# Patient Record
Sex: MALE | Race: WHITE | NOT HISPANIC OR LATINO | Employment: OTHER | URBAN - METROPOLITAN AREA
[De-identification: names, ages, dates, MRNs, and addresses within clinical notes are randomized per-mention and may not be internally consistent; named-entity substitution may affect disease eponyms.]

---

## 2023-10-12 DIAGNOSIS — R07.0 THROAT PAIN: Primary | ICD-10-CM

## 2023-10-12 NOTE — PROGRESS NOTES
Patient having persistent throat pain and would like to obtain CT neck. I will placed order and follow up with results. Recently had lab work done, denies any kidney disease.

## 2023-10-16 DIAGNOSIS — R07.0 THROAT PAIN: Primary | ICD-10-CM

## 2023-10-18 ENCOUNTER — LAB (OUTPATIENT)
Dept: LAB | Facility: LAB | Age: 53
End: 2023-10-18
Payer: COMMERCIAL

## 2023-10-18 DIAGNOSIS — R07.0 THROAT PAIN: ICD-10-CM

## 2023-10-18 LAB
ALBUMIN SERPL BCP-MCNC: 5.1 G/DL (ref 3.4–5)
ANION GAP SERPL CALC-SCNC: 11 MMOL/L (ref 10–20)
BUN SERPL-MCNC: 6 MG/DL (ref 6–23)
CALCIUM SERPL-MCNC: 9.8 MG/DL (ref 8.6–10.3)
CHLORIDE SERPL-SCNC: 97 MMOL/L (ref 98–107)
CO2 SERPL-SCNC: 30 MMOL/L (ref 21–32)
CREAT SERPL-MCNC: 0.78 MG/DL (ref 0.5–1.3)
GFR SERPL CREATININE-BSD FRML MDRD: >90 ML/MIN/1.73M*2
GLUCOSE SERPL-MCNC: 80 MG/DL (ref 74–99)
PHOSPHATE SERPL-MCNC: 4.1 MG/DL (ref 2.5–4.9)
POTASSIUM SERPL-SCNC: 4.4 MMOL/L (ref 3.5–5.3)
SODIUM SERPL-SCNC: 134 MMOL/L (ref 136–145)

## 2023-10-18 PROCEDURE — 36415 COLL VENOUS BLD VENIPUNCTURE: CPT

## 2023-10-18 PROCEDURE — 80069 RENAL FUNCTION PANEL: CPT

## 2023-10-22 ENCOUNTER — HOSPITAL ENCOUNTER (OUTPATIENT)
Dept: RADIOLOGY | Facility: HOSPITAL | Age: 53
Discharge: HOME | End: 2023-10-22
Payer: COMMERCIAL

## 2023-10-22 DIAGNOSIS — R07.0 THROAT PAIN: ICD-10-CM

## 2023-10-22 PROCEDURE — 70491 CT SOFT TISSUE NECK W/DYE: CPT | Performed by: RADIOLOGY

## 2023-10-22 PROCEDURE — 70491 CT SOFT TISSUE NECK W/DYE: CPT | Mod: MG

## 2023-10-22 PROCEDURE — 2550000001 HC RX 255 CONTRASTS: Performed by: NURSE PRACTITIONER

## 2023-10-22 RX ADMIN — IOHEXOL 100 ML: 350 INJECTION, SOLUTION INTRAVENOUS at 10:17

## 2023-10-26 NOTE — RESULT ENCOUNTER NOTE
Hey, yeah I don't see anything concerning. I didn't see anything when I scoped him the first time. If he is still having throat pain I would also consider scoping him again just to double check but I agree the scan looks good.  I am happy to see him back if he wants just have him schedule if he does. Almost wonder if this is some type of atypical pain related to his spine?

## 2023-12-08 PROBLEM — M25.561 ARTHRALGIA OF RIGHT KNEE: Status: ACTIVE | Noted: 2023-12-08

## 2023-12-08 PROBLEM — T50.905A DRUG REACTION: Status: ACTIVE | Noted: 2021-04-02

## 2023-12-08 PROBLEM — K64.8 INTERNAL HEMORRHOID: Status: ACTIVE | Noted: 2017-12-13

## 2023-12-08 PROBLEM — M24.20 EAGLE'S SYNDROME: Status: ACTIVE | Noted: 2022-12-22

## 2023-12-08 PROBLEM — S62.109A FRACTURE OF WRIST: Status: ACTIVE | Noted: 2021-03-26

## 2023-12-08 PROBLEM — R79.89 ELEVATED LFTS: Status: ACTIVE | Noted: 2023-12-08

## 2023-12-08 PROBLEM — K21.9 ACID REFLUX: Status: ACTIVE | Noted: 2023-12-08

## 2023-12-08 PROBLEM — R19.4 CHANGE IN BOWEL HABITS: Status: ACTIVE | Noted: 2023-12-08

## 2023-12-08 PROBLEM — R21 RASH: Status: ACTIVE | Noted: 2023-12-08

## 2023-12-08 PROBLEM — R10.84 GENERALIZED ABDOMINAL PAIN: Status: ACTIVE | Noted: 2023-12-08

## 2023-12-08 PROBLEM — R91.1 NODULE OF LEFT LUNG: Status: ACTIVE | Noted: 2023-12-08

## 2023-12-08 PROBLEM — R52 WHOLE BODY PAIN: Status: ACTIVE | Noted: 2021-04-02

## 2023-12-08 PROBLEM — B02.8 HERPES ZOSTER WITH COMPLICATION: Status: ACTIVE | Noted: 2019-01-10

## 2023-12-08 PROBLEM — F41.0 PANIC ATTACKS: Status: ACTIVE | Noted: 2023-12-08

## 2023-12-08 PROBLEM — M62.81 MUSCLE WEAKNESS (GENERALIZED): Status: ACTIVE | Noted: 2023-12-08

## 2023-12-08 PROBLEM — D49.0 IPMN (INTRADUCTAL PAPILLARY MUCINOUS NEOPLASM): Status: ACTIVE | Noted: 2020-08-28

## 2023-12-08 PROBLEM — G47.30 SLEEP APNEA: Status: ACTIVE | Noted: 2023-12-08

## 2023-12-08 PROBLEM — R93.1 AGATSTON CAC SCORE 200-399: Status: ACTIVE | Noted: 2023-03-23

## 2023-12-08 PROBLEM — M71.38 SYNOVIAL CYST OF LUMBAR FACET JOINT: Status: ACTIVE | Noted: 2023-12-08

## 2023-12-08 PROBLEM — I70.0 ATHEROSCLEROSIS OF AORTA (CMS-HCC): Status: ACTIVE | Noted: 2023-03-23

## 2023-12-08 PROBLEM — M54.9 BACK PAIN WITH RADIATION: Status: ACTIVE | Noted: 2023-12-08

## 2023-12-08 PROBLEM — R07.89 COSTOCHONDRAL PAIN: Status: ACTIVE | Noted: 2018-02-23

## 2023-12-08 PROBLEM — S43.432S LABRAL TEAR OF SHOULDER, LEFT, SEQUELA: Status: ACTIVE | Noted: 2023-12-08

## 2023-12-08 PROBLEM — R10.9 FLANK PAIN: Status: ACTIVE | Noted: 2022-12-22

## 2023-12-08 PROBLEM — L30.9 DERMATITIS, UNSPECIFIED: Status: ACTIVE | Noted: 2023-12-08

## 2023-12-08 PROBLEM — M96.1 LUMBAR POST-LAMINECTOMY SYNDROME: Status: ACTIVE | Noted: 2023-12-08

## 2023-12-08 PROBLEM — M51.27 HERNIATED NUCLEUS PULPOSUS, L5-S1: Status: ACTIVE | Noted: 2023-12-08

## 2023-12-08 PROBLEM — R19.7 ACUTE DIARRHEA: Status: ACTIVE | Noted: 2023-12-08

## 2023-12-08 PROBLEM — J03.90 LINGUAL TONSILLITIS: Status: ACTIVE | Noted: 2019-12-18

## 2023-12-08 PROBLEM — R29.898 LOWER EXTREMITY WEAKNESS: Status: ACTIVE | Noted: 2023-12-08

## 2023-12-08 PROBLEM — M25.579 ANKLE PAIN: Status: ACTIVE | Noted: 2023-12-08

## 2023-12-08 PROBLEM — G89.29 CHRONIC LOW BACK PAIN: Status: ACTIVE | Noted: 2023-12-08

## 2023-12-08 PROBLEM — G62.9 POLYNEUROPATHY: Status: ACTIVE | Noted: 2023-12-08

## 2023-12-08 PROBLEM — M25.512 PAIN IN JOINT OF LEFT SHOULDER: Status: ACTIVE | Noted: 2023-12-08

## 2023-12-08 PROBLEM — D17.1 LIPOMA OF ABDOMINAL WALL: Status: ACTIVE | Noted: 2021-04-02

## 2023-12-08 PROBLEM — K76.0 FATTY LIVER: Status: ACTIVE | Noted: 2023-12-08

## 2023-12-08 PROBLEM — M48.062 LUMBAR STENOSIS WITH NEUROGENIC CLAUDICATION: Status: ACTIVE | Noted: 2023-12-08

## 2023-12-08 PROBLEM — J95.830 HEMORRHAGE FOLLOWING TONSILLECTOMY: Status: ACTIVE | Noted: 2019-11-19

## 2023-12-08 PROBLEM — I10 HYPERTENSION: Status: ACTIVE | Noted: 2023-12-08

## 2023-12-08 PROBLEM — R09.82 POSTNASAL DRIP: Status: ACTIVE | Noted: 2020-03-06

## 2023-12-08 PROBLEM — H93.12 TINNITUS, LEFT EAR: Status: ACTIVE | Noted: 2021-02-12

## 2023-12-08 PROBLEM — R20.0 NUMBNESS AND TINGLING OF LEFT LEG: Status: ACTIVE | Noted: 2023-12-08

## 2023-12-08 PROBLEM — R13.10 DYSPHAGIA: Status: ACTIVE | Noted: 2020-05-08

## 2023-12-08 PROBLEM — J35.8 TONSIL STONE: Status: ACTIVE | Noted: 2023-12-08

## 2023-12-08 PROBLEM — R20.2 NUMBNESS AND TINGLING OF LEFT LEG: Status: ACTIVE | Noted: 2023-12-08

## 2023-12-08 PROBLEM — M54.30 SCIATIC NERVE PAIN: Status: ACTIVE | Noted: 2020-03-06

## 2023-12-08 PROBLEM — R89.9 ABNORMAL LABORATORY TEST: Status: ACTIVE | Noted: 2022-12-22

## 2023-12-08 PROBLEM — M48.00 SPINAL STENOSIS: Status: ACTIVE | Noted: 2022-12-22

## 2023-12-08 PROBLEM — F32.A DEPRESSION: Status: ACTIVE | Noted: 2023-12-08

## 2023-12-08 PROBLEM — G52.1 GLOSSOPHARYNGEAL NEURALGIA: Status: ACTIVE | Noted: 2023-12-08

## 2023-12-08 PROBLEM — E78.5 HLD (HYPERLIPIDEMIA): Status: ACTIVE | Noted: 2023-12-08

## 2023-12-08 PROBLEM — R93.89 ABNORMAL CXR: Status: ACTIVE | Noted: 2021-04-02

## 2023-12-08 PROBLEM — K11.5 SUBMANDIBULAR SIALOLITHIASIS: Status: ACTIVE | Noted: 2023-12-08

## 2023-12-08 PROBLEM — M79.2 NEUROGENIC PAIN: Status: ACTIVE | Noted: 2022-12-08

## 2023-12-08 PROBLEM — R09.A2 FEELING OF FOREIGN BODY IN THROAT: Status: ACTIVE | Noted: 2023-12-08

## 2023-12-08 PROBLEM — E66.9 OBESITY: Status: ACTIVE | Noted: 2023-12-08

## 2023-12-08 PROBLEM — H90.3 SENSORINEURAL HEARING LOSS, BILATERAL: Status: ACTIVE | Noted: 2021-02-12

## 2023-12-08 PROBLEM — M54.50 CHRONIC LOW BACK PAIN: Status: ACTIVE | Noted: 2023-12-08

## 2023-12-08 RX ORDER — EPINEPHRINE 0.3 MG/.3ML
INJECTION SUBCUTANEOUS
COMMUNITY
Start: 2023-10-24

## 2023-12-08 RX ORDER — DULOXETIN HYDROCHLORIDE 30 MG/1
CAPSULE, DELAYED RELEASE ORAL
COMMUNITY
Start: 2023-01-26

## 2023-12-08 RX ORDER — CYCLOBENZAPRINE HCL 10 MG
TABLET ORAL EVERY 8 HOURS PRN
COMMUNITY
Start: 2022-09-22

## 2023-12-08 RX ORDER — CHOLECALCIFEROL (VITAMIN D3) 25 MCG
TABLET ORAL
COMMUNITY

## 2023-12-08 RX ORDER — FLUTICASONE PROPIONATE 50 MCG
SPRAY, SUSPENSION (ML) NASAL
COMMUNITY
Start: 2023-10-24

## 2023-12-08 RX ORDER — AZELASTINE 1 MG/ML
SPRAY, METERED NASAL
COMMUNITY
Start: 2023-10-23

## 2023-12-08 RX ORDER — BUSPIRONE HYDROCHLORIDE 7.5 MG/1
TABLET ORAL
COMMUNITY
Start: 2019-04-12

## 2023-12-08 RX ORDER — BACLOFEN 20 MG/1
TABLET ORAL
COMMUNITY
Start: 2023-10-24

## 2023-12-08 RX ORDER — HYDROCORTISONE 25 MG/G
CREAM TOPICAL
COMMUNITY
Start: 2020-08-04

## 2023-12-08 RX ORDER — HYDROXYZINE PAMOATE 50 MG/1
CAPSULE ORAL
COMMUNITY
Start: 2023-01-13

## 2023-12-08 RX ORDER — ERYTHROMYCIN 5 MG/G
OINTMENT OPHTHALMIC
COMMUNITY
Start: 2023-01-30

## 2023-12-08 RX ORDER — GABAPENTIN 600 MG/1
600 TABLET ORAL 3 TIMES DAILY
COMMUNITY
Start: 2017-09-11

## 2023-12-08 RX ORDER — SUCRALFATE 1 G/1
TABLET ORAL
COMMUNITY
Start: 2020-02-21

## 2023-12-08 RX ORDER — KETOROLAC TROMETHAMINE 10 MG/1
TABLET, FILM COATED ORAL
COMMUNITY
Start: 2023-05-11

## 2023-12-08 RX ORDER — GABAPENTIN 300 MG/1
CAPSULE ORAL
COMMUNITY
Start: 2022-06-13

## 2023-12-08 RX ORDER — ALBUTEROL SULFATE 90 UG/1
2 AEROSOL, METERED RESPIRATORY (INHALATION) EVERY 4 HOURS PRN
COMMUNITY
Start: 2017-07-03

## 2023-12-08 RX ORDER — DOCUSATE SODIUM 100 MG/1
CAPSULE, LIQUID FILLED ORAL
COMMUNITY
Start: 2022-09-22

## 2023-12-08 RX ORDER — CARBAMAZEPINE 200 MG/1
TABLET, EXTENDED RELEASE ORAL
COMMUNITY
Start: 2023-10-09

## 2023-12-08 RX ORDER — ASPIRIN 81 MG/1
TABLET ORAL
COMMUNITY

## 2023-12-08 RX ORDER — ACETIC ACID 20.65 MG/ML
5 SOLUTION AURICULAR (OTIC) 3 TIMES DAILY
COMMUNITY
Start: 2021-02-11

## 2023-12-08 RX ORDER — PERPHENAZINE 8 MG
TABLET ORAL
COMMUNITY

## 2023-12-08 RX ORDER — ATORVASTATIN CALCIUM 40 MG/1
TABLET, FILM COATED ORAL
COMMUNITY
Start: 2023-10-24

## 2023-12-08 RX ORDER — CARBAMAZEPINE 200 MG/1
TABLET ORAL
COMMUNITY

## 2023-12-08 RX ORDER — TAMSULOSIN HYDROCHLORIDE 0.4 MG/1
CAPSULE ORAL
COMMUNITY
Start: 2023-10-09

## 2023-12-08 RX ORDER — DULOXETIN HYDROCHLORIDE 60 MG/1
CAPSULE, DELAYED RELEASE ORAL
COMMUNITY
Start: 2023-10-16

## 2023-12-08 RX ORDER — OLMESARTAN MEDOXOMIL 20 MG/1
TABLET ORAL
COMMUNITY
Start: 2023-10-30

## 2023-12-08 RX ORDER — NEOMYCIN SULFATE, POLYMYXIN B SULFATE, AND DEXAMETHASONE 3.5; 10000; 1 MG/G; [USP'U]/G; MG/G
OINTMENT OPHTHALMIC
COMMUNITY
Start: 2023-02-09

## 2023-12-08 RX ORDER — BUSPIRONE HYDROCHLORIDE 15 MG/1
TABLET ORAL
COMMUNITY
Start: 2023-10-24

## 2023-12-11 ENCOUNTER — OFFICE VISIT (OUTPATIENT)
Dept: OTOLARYNGOLOGY | Facility: CLINIC | Age: 53
End: 2023-12-11
Payer: MEDICARE

## 2023-12-11 VITALS — WEIGHT: 207 LBS | BODY MASS INDEX: 30.66 KG/M2 | HEIGHT: 69 IN

## 2023-12-11 DIAGNOSIS — H93.8X3 SENSATION OF PLUGGED EAR ON BOTH SIDES: Primary | ICD-10-CM

## 2023-12-11 DIAGNOSIS — H61.23 BILATERAL IMPACTED CERUMEN: ICD-10-CM

## 2023-12-11 PROBLEM — I65.23 ATHEROSCLEROSIS OF BOTH CAROTID ARTERIES: Status: ACTIVE | Noted: 2023-10-26

## 2023-12-11 PROCEDURE — 99212 OFFICE O/P EST SF 10 MIN: CPT | Performed by: NURSE PRACTITIONER

## 2023-12-11 PROCEDURE — 1036F TOBACCO NON-USER: CPT | Performed by: NURSE PRACTITIONER

## 2023-12-11 PROCEDURE — 69210 REMOVE IMPACTED EAR WAX UNI: CPT | Performed by: NURSE PRACTITIONER

## 2023-12-11 ASSESSMENT — PATIENT HEALTH QUESTIONNAIRE - PHQ9
SUM OF ALL RESPONSES TO PHQ9 QUESTIONS 1 AND 2: 0
1. LITTLE INTEREST OR PLEASURE IN DOING THINGS: NOT AT ALL
2. FEELING DOWN, DEPRESSED OR HOPELESS: NOT AT ALL

## 2023-12-11 ASSESSMENT — COLUMBIA-SUICIDE SEVERITY RATING SCALE - C-SSRS
6. HAVE YOU EVER DONE ANYTHING, STARTED TO DO ANYTHING, OR PREPARED TO DO ANYTHING TO END YOUR LIFE?: NO
1. IN THE PAST MONTH, HAVE YOU WISHED YOU WERE DEAD OR WISHED YOU COULD GO TO SLEEP AND NOT WAKE UP?: NO
2. HAVE YOU ACTUALLY HAD ANY THOUGHTS OF KILLING YOURSELF?: NO

## 2023-12-11 ASSESSMENT — ENCOUNTER SYMPTOMS
LOSS OF SENSATION IN FEET: 1
OCCASIONAL FEELINGS OF UNSTEADINESS: 0
DEPRESSION: 0

## 2023-12-11 ASSESSMENT — PAIN SCALES - GENERAL: PAINLEVEL: 0-NO PAIN

## 2023-12-11 NOTE — PROGRESS NOTES
Subjective   Patient ID: Willard Arriaga is a 53 y.o. male who presents for Cerumen Impaction.    HPI  Accompanied by 9 year-old daughter.  Here for ear cleaning. No complaints with the ears.   Still having issues with the throat. Is going to schedule appt with Dr. Feliz to further discuss CT.     Patient Active Problem List   Diagnosis    Whole body pain    Otalgia    Neurogenic pain    Cervicalgia    Tinnitus, left ear    Synovial cyst of lumbar facet joint    Spinal stenosis    Lumbosacral radiculopathy at S1    Lumbar stenosis with neurogenic claudication    Lumbar post-laminectomy syndrome    Labral tear of shoulder, left, sequela    Cervical radiculopathy    Solitary pulmonary nodule    Nodule of left lung    Sleep apnea    Obstructive sleep apnea (adult) (pediatric)    Submandibular sialolithiasis    Sensorineural hearing loss, bilateral    S/P lumbar spine operation    Rash    Postnasal drip    Polyneuropathy    Panic attacks    Pain in joint of left shoulder    Displacement of lumbar intervertebral disc without myelopathy    Obesity    Numbness and tingling of left leg    Nerve root and plexus disorder    Muscle weakness (generalized)    Lower extremity weakness    Lumbosacral spondylosis without myelopathy    Lipoma of abdominal wall    Lingual tonsillitis    IPMN (intraductal papillary mucinous neoplasm)    Internal hemorrhoid    Hypertension    HLD (hyperlipidemia)    Herpes zoster with complication    Herniated nucleus pulposus, L5-S1    Hemorrhage following tonsillectomy    Glossopharyngeal neuralgia    Eagle's syndrome    Generalized abdominal pain    Acid reflux    Fracture of wrist    Flank pain    Feeling of foreign body in throat    Fatty liver    Elevated LFTs    Elevated fasting glucose    Dysphagia    Drug reaction    Dermatitis, unspecified    Depression    Degenerative disc disease, cervical    Costochondral pain    Closed fracture of carpal bone    Chronic low back pain    Change in bowel  habits    Atherosclerosis of aorta (CMS/HCC)    Arthralgia of right knee    Anxiety    Ankle pain    Tonsil stone    Agatston CAC score 200-399    Acute laryngitis    Acute diarrhea    Abnormal laboratory test    Abnormal CXR    Abnormal blood chemistry    Lung disease    Back pain with radiation    Sciatic nerve pain    Atherosclerosis of both carotid arteries     Past Surgical History:   Procedure Laterality Date    ANKLE SURGERY  07/31/2014    Ankle Surgery    CT ANGIO CORONARY ART WITH HEARTFLOW IF SCORE >30%  1/6/2023    CT ANGIO CORONARY ART WITH HEARTFLOW IF SCORE >30% 1/6/2023    OTHER SURGICAL HISTORY  07/31/2014    Fusion / Refusion Of 2-3 Vertebrae    OTHER SURGICAL HISTORY  07/31/2014    Ant Spinal Diskectomy, Osteophytectomy Lumbar Interspace    OTHER SURGICAL HISTORY  07/25/2019    Sialoadenectomy     Review of Systems    All other systems have been reviewed and are negative for complaints except for those mentioned in history of present illness, past medical history and problem list.    Objective   Physical Exam    Right Ear: External inspection of ear with no deformity, scars, or masses. EAC is impacted with cerumen, TM not visible.     Left Ear: External inspection of ear with no deformity, scars, or masses. EAC is impacted with cerumen, TM not visible.     Ear cerumen removal    Date/Time: 12/11/2023 4:44 PM    Performed by: NU Baires  Authorized by: NU Baires    Procedure details:     Location:  L ear and R ear    Procedure outcomes: cerumen removed    Post-procedure details:     Inspection:  No bleeding, ear canal clear and TM intact    Hearing quality:  Improved    Procedure completion:  Tolerated  Comments:      Bilateral canals with cerumen impaction.  Using the microscope, suction, and alligator forceps, large amounts of soft brown cerumen removed bilaterally. Both TMs intact. No effusions or retractions noted.  Patient tolerated procedure well.       Assessment/Plan   Diagnoses and all orders for this visit:  Sensation of plugged ear on both sides  Bilateral impacted cerumen  Follow up in 6 months for repeat ear cleaning.  All questions answered to patient satisfaction.          VIRAL Baires-CNP 12/11/23 3:56 PM

## 2024-03-11 ENCOUNTER — OFFICE VISIT (OUTPATIENT)
Dept: OTOLARYNGOLOGY | Facility: CLINIC | Age: 54
End: 2024-03-11
Payer: COMMERCIAL

## 2024-03-11 VITALS
WEIGHT: 211 LBS | BODY MASS INDEX: 31.25 KG/M2 | HEIGHT: 69 IN | SYSTOLIC BLOOD PRESSURE: 147 MMHG | TEMPERATURE: 98.2 F | DIASTOLIC BLOOD PRESSURE: 87 MMHG | HEART RATE: 60 BPM

## 2024-03-11 DIAGNOSIS — H61.23 EXCESSIVE CERUMEN IN BOTH EAR CANALS: ICD-10-CM

## 2024-03-11 DIAGNOSIS — H92.01 RIGHT EAR PAIN: Primary | ICD-10-CM

## 2024-03-11 PROCEDURE — 3077F SYST BP >= 140 MM HG: CPT | Performed by: NURSE PRACTITIONER

## 2024-03-11 PROCEDURE — 99213 OFFICE O/P EST LOW 20 MIN: CPT | Performed by: NURSE PRACTITIONER

## 2024-03-11 PROCEDURE — 3079F DIAST BP 80-89 MM HG: CPT | Performed by: NURSE PRACTITIONER

## 2024-03-11 PROCEDURE — 1036F TOBACCO NON-USER: CPT | Performed by: NURSE PRACTITIONER

## 2024-03-11 SDOH — ECONOMIC STABILITY: FOOD INSECURITY: WITHIN THE PAST 12 MONTHS, THE FOOD YOU BOUGHT JUST DIDN'T LAST AND YOU DIDN'T HAVE MONEY TO GET MORE.: NEVER TRUE

## 2024-03-11 SDOH — ECONOMIC STABILITY: FOOD INSECURITY: WITHIN THE PAST 12 MONTHS, YOU WORRIED THAT YOUR FOOD WOULD RUN OUT BEFORE YOU GOT MONEY TO BUY MORE.: NEVER TRUE

## 2024-03-11 ASSESSMENT — LIFESTYLE VARIABLES
HOW OFTEN DO YOU HAVE SIX OR MORE DRINKS ON ONE OCCASION: NEVER
HOW MANY STANDARD DRINKS CONTAINING ALCOHOL DO YOU HAVE ON A TYPICAL DAY: 1 OR 2
HOW OFTEN DO YOU HAVE A DRINK CONTAINING ALCOHOL: 2-3 TIMES A WEEK
SKIP TO QUESTIONS 9-10: 1
AUDIT-C TOTAL SCORE: 3

## 2024-03-11 ASSESSMENT — ENCOUNTER SYMPTOMS
OCCASIONAL FEELINGS OF UNSTEADINESS: 0
LOSS OF SENSATION IN FEET: 0
DEPRESSION: 0

## 2024-03-11 ASSESSMENT — PAIN SCALES - GENERAL: PAINLEVEL: 2

## 2024-03-11 NOTE — PROGRESS NOTES
Subjective   Patient ID: Willard Arriaga is a 53 y.o. male who presents for Cerumen Impaction.    HPI  Here for ear cleaning. He is having right ear pain, since 1/31. Initially it was constant. Now it comes and goes.   Last cleaning was 12/11/2023.  Still having some throat issues, likely stemming from his neck.       Patient Active Problem List   Diagnosis    Whole body pain    Otalgia    Neurogenic pain    Cervicalgia    Tinnitus, left ear    Synovial cyst of lumbar facet joint    Spinal stenosis    Lumbosacral radiculopathy at S1    Lumbar stenosis with neurogenic claudication    Lumbar post-laminectomy syndrome    Labral tear of shoulder, left, sequela    Cervical radiculopathy    Solitary pulmonary nodule    Nodule of left lung    Sleep apnea    Obstructive sleep apnea (adult) (pediatric)    Submandibular sialolithiasis    Sensorineural hearing loss, bilateral    S/P lumbar spine operation    Rash    Postnasal drip    Polyneuropathy    Panic attacks    Pain in joint of left shoulder    Displacement of lumbar intervertebral disc without myelopathy    Obesity    Numbness and tingling of left leg    Nerve root and plexus disorder    Muscle weakness (generalized)    Lower extremity weakness    Lumbosacral spondylosis without myelopathy    Lipoma of abdominal wall    Lingual tonsillitis    IPMN (intraductal papillary mucinous neoplasm)    Internal hemorrhoid    Hypertension    HLD (hyperlipidemia)    Herpes zoster with complication    Herniated nucleus pulposus, L5-S1    Hemorrhage following tonsillectomy    Glossopharyngeal neuralgia    Eagle's syndrome    Generalized abdominal pain    Acid reflux    Fracture of wrist    Flank pain    Feeling of foreign body in throat    Fatty liver    Elevated LFTs    Elevated fasting glucose    Dysphagia    Drug reaction    Dermatitis, unspecified    Depression    Degenerative disc disease, cervical    Costochondral pain    Closed fracture of carpal bone    Chronic low back pain     Change in bowel habits    Atherosclerosis of aorta (CMS/HCC)    Arthralgia of right knee    Anxiety    Ankle pain    Tonsil stone    Agatston CAC score 200-399    Acute laryngitis    Acute diarrhea    Abnormal laboratory test    Abnormal CXR    Abnormal blood chemistry    Lung disease    Back pain with radiation    Sciatic nerve pain    Atherosclerosis of both carotid arteries     Past Surgical History:   Procedure Laterality Date    ANKLE SURGERY  07/31/2014    Ankle Surgery    CT ANGIO CORONARY ART WITH HEARTFLOW IF SCORE >30%  1/6/2023    CT ANGIO CORONARY ART WITH HEARTFLOW IF SCORE >30% 1/6/2023    OTHER SURGICAL HISTORY  07/31/2014    Fusion / Refusion Of 2-3 Vertebrae    OTHER SURGICAL HISTORY  07/31/2014    Ant Spinal Diskectomy, Osteophytectomy Lumbar Interspace    OTHER SURGICAL HISTORY  07/25/2019    Sialoadenectomy     Review of Systems    All other systems have been reviewed and are negative for complaints except for those mentioned in history of present illness, past medical history and problem list.    Objective   Physical Exam    Constitutional: No fever, chills, weight loss or weight gain  General appearance: Appears well, well-nourished, well groomed. No acute distress.    Communication: Normal communication    Psychiatric: Oriented to person, place and time. Normal mood and affect.    Neurologic: Cranial nerves II-XII grossly intact and symmetric bilaterally.    Head and Face:  Head: Atraumatic with no masses, lesions or scarring.  Face: Normal symmetry. No scars or deformities.  TMJ: Normal, no trismus.    Eyes: Conjunctiva not edematous or erythematous.     Right Ear: External inspection of ear with no deformity, scars, or masses. Ear canal is with non-occluding cerumen that was removed using the microscope, suction, and alligator forceps. After removal, TM is intact with no sign of infection, effusion, or retraction.      Left Ear: External inspection of ear with no deformity, scars, or  masses. Ear canal is with non-occluding cerumen that was removed using the microscope, suction, and alligator forceps. After removal, TM is intact with no sign of infection, effusion, or retraction.      Nose: External inspection of nose: No nasal lesions, lacerations or scars. Anterior rhinoscopy with limited visualization past the inferior turbinates. No tenderness on frontal or maxillary sinus palpation.    Oral Cavity/Mouth: Oral cavity and oropharynx mucosa moist and pink. No lesions or masses. Dentition normal. Tonsils appear surgically removed. Uvula is midline. Tongue with no masses or lesions. Tongue with good mobility. The oropharynx is clear.    Neck: Normal appearing, symmetric, trachea midline.     Cardiovascular: Examination of peripheral vascular system shows no clubbing or cyanosis.    Respiratory: No respiratory distress increased work of breathing. Inspection of the chest with symmetric chest expansion and normal respiratory effort.    Skin: No head and neck rashes.    Lymph nodes: No adenopathy.     Assessment/Plan   Diagnoses and all orders for this visit:  Right otalgia  Excessive cerumen bilaterally.  Ears were successfully cleaned. Reassurance given. Ear pain may be due to his neck issues. Discussed CT IAC, CT neck showed clear mastoids. Will monitor for now.  Follow up in 6 months for repeat ear cleaning.  All questions answered to patient satisfaction.          VIRAL Baires-CNP 03/11/24 3:28 PM

## 2024-06-03 ENCOUNTER — APPOINTMENT (OUTPATIENT)
Dept: OTOLARYNGOLOGY | Facility: CLINIC | Age: 54
End: 2024-06-03
Payer: COMMERCIAL

## 2024-06-05 ENCOUNTER — APPOINTMENT (OUTPATIENT)
Dept: OTOLARYNGOLOGY | Facility: CLINIC | Age: 54
End: 2024-06-05
Payer: COMMERCIAL

## 2024-07-17 ENCOUNTER — OFFICE VISIT (OUTPATIENT)
Dept: OTOLARYNGOLOGY | Facility: CLINIC | Age: 54
End: 2024-07-17
Payer: COMMERCIAL

## 2024-07-17 VITALS
WEIGHT: 193 LBS | DIASTOLIC BLOOD PRESSURE: 66 MMHG | SYSTOLIC BLOOD PRESSURE: 115 MMHG | TEMPERATURE: 97.6 F | BODY MASS INDEX: 28.58 KG/M2 | HEIGHT: 69 IN | HEART RATE: 66 BPM

## 2024-07-17 DIAGNOSIS — H61.23 EXCESSIVE CERUMEN IN BOTH EAR CANALS: Primary | ICD-10-CM

## 2024-07-17 DIAGNOSIS — J02.9 SORE THROAT: ICD-10-CM

## 2024-07-17 PROCEDURE — 99213 OFFICE O/P EST LOW 20 MIN: CPT | Performed by: NURSE PRACTITIONER

## 2024-07-17 PROCEDURE — 3008F BODY MASS INDEX DOCD: CPT | Performed by: NURSE PRACTITIONER

## 2024-07-17 PROCEDURE — 3078F DIAST BP <80 MM HG: CPT | Performed by: NURSE PRACTITIONER

## 2024-07-17 PROCEDURE — 3074F SYST BP LT 130 MM HG: CPT | Performed by: NURSE PRACTITIONER

## 2024-07-17 RX ORDER — ACETAMINOPHEN 325 MG/1
1 TABLET ORAL EVERY 6 HOURS PRN
COMMUNITY
Start: 2024-07-12

## 2024-07-17 ASSESSMENT — ENCOUNTER SYMPTOMS
OCCASIONAL FEELINGS OF UNSTEADINESS: 0
LOSS OF SENSATION IN FEET: 0
DEPRESSION: 0

## 2024-07-17 ASSESSMENT — PAIN SCALES - GENERAL: PAINLEVEL: 0-NO PAIN

## 2024-07-17 NOTE — PROGRESS NOTES
Subjective   Patient ID: Willard Arriaga is a 53 y.o. male who presents for No chief complaint on file..    HPI  Patient here for ear cleaning. Accompanied by his two younger children. Last visit was 3/11/2024.  Ears are fine.   On Friday he woke up with a sore throat, had a red spot behind his uvula. Went to urgent care, strep negative.  Was diagnosed with viral pharyngitis. The last two days it has started feeling better, and it looks better.     Patient Active Problem List   Diagnosis    Whole body pain    Otalgia    Neurogenic pain    Cervicalgia    Tinnitus, left ear    Synovial cyst of lumbar facet joint    Spinal stenosis    Lumbosacral radiculopathy at S1    Lumbar stenosis with neurogenic claudication    Lumbar post-laminectomy syndrome    Labral tear of shoulder, left, sequela    Cervical radiculopathy    Solitary pulmonary nodule    Nodule of left lung    Sleep apnea    Obstructive sleep apnea (adult) (pediatric)    Submandibular sialolithiasis    Sensorineural hearing loss, bilateral    S/P lumbar spine operation    Rash    Postnasal drip    Polyneuropathy    Panic attacks    Pain in joint of left shoulder    Displacement of lumbar intervertebral disc without myelopathy    Obesity    Numbness and tingling of left leg    Nerve root and plexus disorder    Muscle weakness (generalized)    Lower extremity weakness    Lumbosacral spondylosis without myelopathy    Lipoma of abdominal wall    Lingual tonsillitis    IPMN (intraductal papillary mucinous neoplasm)    Internal hemorrhoid    Hypertension    HLD (hyperlipidemia)    Herpes zoster with complication    Herniated nucleus pulposus, L5-S1    Hemorrhage following tonsillectomy    Glossopharyngeal neuralgia    Eagle's syndrome    Generalized abdominal pain    Acid reflux    Fracture of wrist    Flank pain    Feeling of foreign body in throat    Fatty liver    Elevated LFTs    Elevated fasting glucose    Dysphagia    Drug reaction    Dermatitis, unspecified     Depression    Degenerative disc disease, cervical    Costochondral pain    Closed fracture of carpal bone    Chronic low back pain    Change in bowel habits    Atherosclerosis of aorta (CMS-HCC)    Arthralgia of right knee    Anxiety    Ankle pain    Tonsil stone    [place] CAC score 200-399    Acute laryngitis    Acute diarrhea    Abnormal laboratory test    Abnormal CXR    Abnormal blood chemistry    Lung disease    Back pain with radiation    Sciatic nerve pain    Atherosclerosis of both carotid arteries     Past Surgical History:   Procedure Laterality Date    ANKLE SURGERY  07/31/2014    Ankle Surgery    CT ANGIO CORONARY ART WITH HEARTFLOW IF SCORE >30%  1/6/2023    CT ANGIO CORONARY ART WITH HEARTFLOW IF SCORE >30% 1/6/2023    OTHER SURGICAL HISTORY  07/31/2014    Fusion / Refusion Of 2-3 Vertebrae    OTHER SURGICAL HISTORY  07/31/2014    Ant Spinal Diskectomy, Osteophytectomy Lumbar Interspace    OTHER SURGICAL HISTORY  07/25/2019    Sialoadenectomy     Review of Systems    All other systems have been reviewed and are negative for complaints except for those mentioned in history of present illness, past medical history and problem list.    Objective   Physical Exam    Constitutional: No fever, chills, weight loss or weight gain  General appearance: Appears well, well-nourished, well groomed. No acute distress.    Communication: Normal communication    Psychiatric: Oriented to person, place and time. Normal mood and affect.    Neurologic: Cranial nerves II-XII grossly intact and symmetric bilaterally.    Head and Face:  Head: Atraumatic with no masses, lesions or scarring.  Face: Normal symmetry. No scars or deformities.  TMJ: Normal, no trismus.    Eyes: Conjunctiva not edematous or erythematous.     Right Ear: External inspection of ear with no deformity, scars, or masses. Ear canal is with non-occluding cerumen that was removed using the otoscope and suction. After removal, TM is intact with no sign of  infection, effusion, or retraction.      Left Ear: External inspection of ear with no deformity, scars, or masses. Ear canal is with non-occluding cerumen that was removed using the otoscope and suction. After removal, TM is intact with no sign of infection, effusion, or retraction.      Nose: External inspection of nose: No nasal lesions, lacerations or scars. Anterior rhinoscopy with limited visualization past the inferior turbinates. No tenderness on frontal or maxillary sinus palpation.    Oral Cavity/Mouth: Oral cavity and oropharynx mucosa moist and pink. No lesions or masses. Dentition normal. Tonsils appear surgically removed. There is a 1 mm area of redness/irritation at the posterior pharyngeal wall. No evidence of infection, or concerning mass/lesion. Uvula is midline. Tongue with no masses or lesions. Tongue with good mobility. The oropharynx is clear.    Neck: Normal appearing, symmetric, trachea midline.     Cardiovascular: Examination of peripheral vascular system shows no clubbing or cyanosis.    Respiratory: No respiratory distress increased work of breathing. Inspection of the chest with symmetric chest expansion and normal respiratory effort.    Skin: No head and neck rashes.    Lymph nodes: No adenopathy.     Assessment/Plan   Diagnoses and all orders for this visit:  Excessive cerumen bilaterally.  Ears were successfully cleaned. Reassurance given. Follow up in 3-4 months for repeat ear cleaning, or as needed.  Sore throat  Reassurance given. Symptoms seem to be improving. Exam shows nothing worrisome. Monitor at this time and follow up with any new or worsening symptoms.    All questions answered to patient satisfaction.      VIRAL Baires-CNP 07/17/24 2:53 PM    no

## 2024-08-29 ENCOUNTER — TELEPHONE (OUTPATIENT)
Dept: AUDIOLOGY | Facility: CLINIC | Age: 54
End: 2024-08-29
Payer: COMMERCIAL

## 2024-09-04 ENCOUNTER — OFFICE VISIT (OUTPATIENT)
Dept: OTOLARYNGOLOGY | Facility: CLINIC | Age: 54
End: 2024-09-04
Payer: COMMERCIAL

## 2024-09-04 VITALS
RESPIRATION RATE: 16 BRPM | HEART RATE: 65 BPM | DIASTOLIC BLOOD PRESSURE: 83 MMHG | SYSTOLIC BLOOD PRESSURE: 142 MMHG | WEIGHT: 200.25 LBS | HEIGHT: 69 IN | OXYGEN SATURATION: 99 % | BODY MASS INDEX: 29.66 KG/M2 | TEMPERATURE: 98 F

## 2024-09-04 DIAGNOSIS — M26.629 TMJ PAIN DYSFUNCTION SYNDROME: ICD-10-CM

## 2024-09-04 DIAGNOSIS — H92.01 RIGHT EAR PAIN: Primary | ICD-10-CM

## 2024-09-04 PROCEDURE — 3077F SYST BP >= 140 MM HG: CPT | Performed by: NURSE PRACTITIONER

## 2024-09-04 PROCEDURE — 99213 OFFICE O/P EST LOW 20 MIN: CPT | Performed by: NURSE PRACTITIONER

## 2024-09-04 PROCEDURE — 3079F DIAST BP 80-89 MM HG: CPT | Performed by: NURSE PRACTITIONER

## 2024-09-04 PROCEDURE — 1036F TOBACCO NON-USER: CPT | Performed by: NURSE PRACTITIONER

## 2024-09-04 PROCEDURE — 3008F BODY MASS INDEX DOCD: CPT | Performed by: NURSE PRACTITIONER

## 2024-09-04 ASSESSMENT — ENCOUNTER SYMPTOMS
OCCASIONAL FEELINGS OF UNSTEADINESS: 0
LOSS OF SENSATION IN FEET: 1
DEPRESSION: 0

## 2024-09-04 ASSESSMENT — PATIENT HEALTH QUESTIONNAIRE - PHQ9
2. FEELING DOWN, DEPRESSED OR HOPELESS: NOT AT ALL
1. LITTLE INTEREST OR PLEASURE IN DOING THINGS: NOT AT ALL
SUM OF ALL RESPONSES TO PHQ9 QUESTIONS 1 AND 2: 0

## 2024-09-04 ASSESSMENT — PAIN SCALES - GENERAL: PAINLEVEL: 2

## 2024-09-04 NOTE — PROGRESS NOTES
DOCUMENTATION ONLY:  Prior authorization for Humira approved from 11/16/18 to 12/31/19    Case Id: [id number]    Co-pay: $3.70    Patient Assistance is not required.     Subjective   Patient ID: Willard Arriaga is a 53 y.o. male who presents for Follow-up.    HPI  Patient following up for a possible ear infection.  He is accompanied by his 2 younger children.  He started his Levofloxacin on Saturday. Been taking his probiotics as well. Seems to be helping. Saw PCP.   Was having jaw pain as well, thinks TMJ. Has a mouthguard that does help.  Still having right ear pain. He denies any hearing loss.    Patient Active Problem List   Diagnosis    Whole body pain    Otalgia    Neurogenic pain    Cervicalgia    Tinnitus, left ear    Synovial cyst of lumbar facet joint    Spinal stenosis    Lumbosacral radiculopathy at S1    Lumbar stenosis with neurogenic claudication    Lumbar post-laminectomy syndrome    Labral tear of shoulder, left, sequela    Cervical radiculopathy    Solitary pulmonary nodule    Nodule of left lung    Sleep apnea    Obstructive sleep apnea (adult) (pediatric)    Submandibular sialolithiasis    Sensorineural hearing loss, bilateral    S/P lumbar spine operation    Rash    Postnasal drip    Polyneuropathy    Panic attacks    Pain in joint of left shoulder    Displacement of lumbar intervertebral disc without myelopathy    Obesity    Numbness and tingling of left leg    Nerve root and plexus disorder    Muscle weakness (generalized)    Lower extremity weakness    Lumbosacral spondylosis without myelopathy    Lipoma of abdominal wall    Lingual tonsillitis    IPMN (intraductal papillary mucinous neoplasm)    Internal hemorrhoid    Hypertension    HLD (hyperlipidemia)    Herpes zoster with complication    Herniated nucleus pulposus, L5-S1    Hemorrhage following tonsillectomy    Glossopharyngeal neuralgia    Eagle's syndrome    Generalized abdominal pain    Acid reflux    Fracture of wrist    Flank pain    Feeling of foreign body in throat    Fatty liver    Elevated LFTs    Elevated fasting glucose    Dysphagia    Drug reaction    Dermatitis, unspecified    Depression     Degenerative disc disease, cervical    Costochondral pain    Closed fracture of carpal bone    Chronic low back pain    Change in bowel habits    Atherosclerosis of aorta (CMS-HCC)    Arthralgia of right knee    Anxiety    Ankle pain    Tonsil stone    [place] CAC score 200-399    Acute laryngitis    Acute diarrhea    Abnormal laboratory test    Abnormal CXR    Abnormal blood chemistry    Lung disease    Back pain with radiation    Sciatic nerve pain    Atherosclerosis of both carotid arteries     Past Surgical History:   Procedure Laterality Date    ANKLE SURGERY  07/31/2014    Ankle Surgery    CT ANGIO CORONARY ART WITH HEARTFLOW IF SCORE >30%  1/6/2023    CT ANGIO CORONARY ART WITH HEARTFLOW IF SCORE >30% 1/6/2023    OTHER SURGICAL HISTORY  07/31/2014    Fusion / Refusion Of 2-3 Vertebrae    OTHER SURGICAL HISTORY  07/31/2014    Ant Spinal Diskectomy, Osteophytectomy Lumbar Interspace    OTHER SURGICAL HISTORY  07/25/2019    Sialoadenectomy     Review of Systems    All other systems have been reviewed and are negative for complaints except for those mentioned in history of present illness, past medical history and problem list.    Objective   Physical Exam    Constitutional: No fever, chills, weight loss or weight gain  General appearance: Appears well, well-nourished, well groomed. No acute distress.    Communication: Normal communication    Psychiatric: Oriented to person, place and time. Normal mood and affect.    Neurologic: Cranial nerves II-XII grossly intact and symmetric bilaterally.    Head and Face:  Head: Atraumatic with no masses, lesions or scarring.  Face: Normal symmetry. No scars or deformities.  TMJ: Normal, no trismus.    Eyes: Conjunctiva not edematous or erythematous.     Right Ear: External inspection of ear with no deformity, scars, or masses. EAC is clear.  TM is intact with no sign of infection, effusion, or retraction.  No perforation seen.     Left Ear: External inspection of ear with  no deformity, scars, or masses. EAC is clear.  TM is intact with no sign of infection, effusion, or retraction.  No perforation seen.      Nose: External inspection of nose: No nasal lesions, lacerations or scars. Anterior rhinoscopy with limited visualization past the inferior turbinates. No tenderness on frontal or maxillary sinus palpation.    Oral Cavity/Mouth: Oral cavity and oropharynx mucosa moist and pink. No lesions or masses. Dentition normal. Tonsils appear surgically removed. There is a 1 mm area of redness/irritation at the posterior pharyngeal wall. No evidence of infection, or concerning mass/lesion. Uvula is midline. Tongue with no masses or lesions. Tongue with good mobility. The oropharynx is clear.    Neck: Normal appearing, symmetric, trachea midline.     Cardiovascular: Examination of peripheral vascular system shows no clubbing or cyanosis.    Respiratory: No respiratory distress increased work of breathing. Inspection of the chest with symmetric chest expansion and normal respiratory effort.    Skin: No head and neck rashes.    Lymph nodes: No adenopathy.     Assessment/Plan   Diagnoses and all orders for this visit:  Right otalgia  TMJ pain  Reassurance given that I do not see evidence of an ear infection on exam today.  I would continue his antibiotic until complete.    The ear pain is likely due to TMJ dysfunction. For the next 2 weeks, use warm compresses 3 times daily to the joint. Follow a soft diet, avoid gum chewing and tough meats. Wear a  at night if you tend to grind your teeth. Take ibuprofen as needed for 3 days.    Follow-up if symptoms do not improve.    All questions answered to patient satisfaction.      VIRAL Baires-CNP 09/04/24 3:31 PM

## 2024-09-04 NOTE — PATIENT INSTRUCTIONS
The ear pain is likely due to TMJ dysfunction. For the next 2 weeks, use warm compresses 3 times daily to the joint. Follow a soft diet, avoid gum chewing and tough meats. Wear a  at night if you tend to grind your teeth. Take ibuprofen as needed for 3 days.    Welcome to [facility]. We are here to assist you through your ENT care at [facility].  Niki is a Nurse Practitioner who specializes in General ENT. This means that she specializes in taking care of patients with usual ENT issues such as nasal congestion, allergy symptoms, sinusitis, hearing loss, ear infections, ear wax removal, hoarseness, sore throat, throat infections, reflux and some swallowing issues. She also sees patients regarding dizziness and vertigo.   Angeline is Niki's  and she answers the office phone from 7:30am-4pm Mon-Fri. Call [phone]. She can help you with scheduling of appointments, and general questions and information. You may need to leave a message if she is helping another patient. In this case, someone from the team will call you back the same day if you leave your message before 3pm, or the next business morning.  Niki currently sees patients at [facility] on Mondays, Wednesdays and Thursdays.  She works closely with audiologists to solve issues with hearing. She is also in very close contact with her collaborative physicians. Dr. Fernandez, a surgeon who specializes in general ENT and rhinology. She also works closely with otologist (ear surgeon) Dr. Stack and head and neck surgery Dr. Covington.   Others who may be included in your care are dieticians, social workers, allergists, gastroenterologists, neurologists, and physical therapists. Niki will provide these referrals as needed. Please let her know if you would like to request a specific referral.  Niki makes every effort to run on time for your appointments. Therefore, if you are more than 15  minutes late unrelated to a scan or another appointment such as therapy or audiology, your appointment will need to be rescheduled for another day. We appreciate your understanding.   We look forward to working with you to meet your healthcare goals.

## 2024-10-21 ENCOUNTER — OFFICE VISIT (OUTPATIENT)
Dept: OTOLARYNGOLOGY | Facility: CLINIC | Age: 54
End: 2024-10-21
Payer: MEDICARE

## 2024-10-21 VITALS
BODY MASS INDEX: 30.36 KG/M2 | WEIGHT: 205 LBS | HEART RATE: 66 BPM | HEIGHT: 69 IN | SYSTOLIC BLOOD PRESSURE: 147 MMHG | TEMPERATURE: 97.8 F | DIASTOLIC BLOOD PRESSURE: 90 MMHG

## 2024-10-21 DIAGNOSIS — H93.8X3 SENSATION OF PLUGGED EAR ON BOTH SIDES: Primary | ICD-10-CM

## 2024-10-21 PROCEDURE — 3077F SYST BP >= 140 MM HG: CPT | Performed by: NURSE PRACTITIONER

## 2024-10-21 PROCEDURE — 99212 OFFICE O/P EST SF 10 MIN: CPT | Performed by: NURSE PRACTITIONER

## 2024-10-21 PROCEDURE — 1036F TOBACCO NON-USER: CPT | Performed by: NURSE PRACTITIONER

## 2024-10-21 PROCEDURE — 3008F BODY MASS INDEX DOCD: CPT | Performed by: NURSE PRACTITIONER

## 2024-10-21 PROCEDURE — 3080F DIAST BP >= 90 MM HG: CPT | Performed by: NURSE PRACTITIONER

## 2024-10-21 RX ORDER — MAGNESIUM 250 MG
250 TABLET ORAL DAILY
COMMUNITY

## 2024-10-21 RX ORDER — ASCORBIC ACID 1000 MG
175 TABLET ORAL 2 TIMES DAILY
COMMUNITY

## 2024-10-21 RX ORDER — ZINC GLUCONATE 50 MG
TABLET ORAL DAILY
COMMUNITY

## 2024-10-21 ASSESSMENT — PAIN SCALES - GENERAL: PAINLEVEL_OUTOF10: 0-NO PAIN

## 2024-10-21 ASSESSMENT — ENCOUNTER SYMPTOMS
DEPRESSION: 0
LOSS OF SENSATION IN FEET: 0
OCCASIONAL FEELINGS OF UNSTEADINESS: 0

## 2024-10-21 NOTE — PROGRESS NOTES
Subjective   Patient ID: Willard Arriaga is a 53 y.o. male who presents for No chief complaint on file..    HPI  Patient here for his ears. No new complaints.   Seeing him a little earlier than 3/4 mos since our last visit was an add on for a possible infection.     Patient Active Problem List   Diagnosis    Whole body pain    Otalgia    Neurogenic pain    Cervicalgia    Tinnitus, left ear    Synovial cyst of lumbar facet joint    Spinal stenosis    Lumbosacral radiculopathy at S1    Lumbar stenosis with neurogenic claudication    Lumbar post-laminectomy syndrome    Labral tear of shoulder, left, sequela    Cervical radiculopathy    Solitary pulmonary nodule    Nodule of left lung    Sleep apnea    Obstructive sleep apnea (adult) (pediatric)    Submandibular sialolithiasis    Sensorineural hearing loss, bilateral    S/P lumbar spine operation    Rash    Postnasal drip    Polyneuropathy    Panic attacks    Pain in joint of left shoulder    Displacement of lumbar intervertebral disc without myelopathy    Obesity    Numbness and tingling of left leg    Nerve root and plexus disorder    Muscle weakness (generalized)    Lower extremity weakness    Lumbosacral spondylosis without myelopathy    Lipoma of abdominal wall    Lingual tonsillitis    IPMN (intraductal papillary mucinous neoplasm)    Internal hemorrhoid    Hypertension    HLD (hyperlipidemia)    Herpes zoster with complication    Herniated nucleus pulposus, L5-S1    Hemorrhage following tonsillectomy    Glossopharyngeal neuralgia    Eagle's syndrome    Generalized abdominal pain    Acid reflux    Fracture of wrist    Flank pain    Feeling of foreign body in throat    Fatty liver    Elevated LFTs    Elevated fasting glucose    Dysphagia    Drug reaction    Dermatitis, unspecified    Depression    Degenerative disc disease, cervical    Costochondral pain    Closed fracture of carpal bone    Chronic low back pain    Change in bowel habits    Atherosclerosis of aorta  (CMS-[facility])    Arthralgia of right knee    Anxiety    Ankle pain    Tonsil stone    [place] CAC score 200-399    Acute laryngitis    Acute diarrhea    Abnormal laboratory test    Abnormal CXR    Abnormal blood chemistry    Lung disease    Back pain with radiation    Sciatic nerve pain    Atherosclerosis of both carotid arteries     Past Surgical History:   Procedure Laterality Date    ANKLE SURGERY  07/31/2014    Ankle Surgery    CT ANGIO CORONARY ART WITH HEARTFLOW IF SCORE >30%  1/6/2023    CT ANGIO CORONARY ART WITH HEARTFLOW IF SCORE >30% 1/6/2023    OTHER SURGICAL HISTORY  07/31/2014    Fusion / Refusion Of 2-3 Vertebrae    OTHER SURGICAL HISTORY  07/31/2014    Ant Spinal Diskectomy, Osteophytectomy Lumbar Interspace    OTHER SURGICAL HISTORY  07/25/2019    Sialoadenectomy     Review of Systems    All other systems have been reviewed and are negative for complaints except for those mentioned in history of present illness, past medical history and problem list.    Objective   Physical Exam    Constitutional: No fever, chills, weight loss or weight gain  General appearance: Appears well, well-nourished, well groomed. No acute distress.    Communication: Normal communication    Psychiatric: Oriented to person, place and time. Normal mood and affect.    Neurologic: Cranial nerves II-XII grossly intact and symmetric bilaterally.    Head and Face:  Head: Atraumatic with no masses, lesions or scarring.  Face: Normal symmetry. No scars or deformities.  TMJ: Normal, no trismus.    Eyes: Conjunctiva not edematous or erythematous.     Right Ear: External inspection of ear with no deformity, scars, or masses. EAC is clear.  TM is intact with no sign of infection, effusion, or retraction.  No perforation seen.     Left Ear: External inspection of ear with no deformity, scars, or masses. EAC is clear.  TM is intact with no sign of infection, effusion, or retraction.  No perforation seen.      Nose: External inspection of  nose: No nasal lesions, lacerations or scars. Anterior rhinoscopy with limited visualization past the inferior turbinates. No tenderness on frontal or maxillary sinus palpation.    Oral Cavity/Mouth: Oral cavity and oropharynx mucosa moist and pink. No lesions or masses. Dentition normal. Tonsils appear surgically removed.  Uvula is midline. Tongue with no masses or lesions. Tongue with good mobility. The oropharynx is clear.    Neck: Normal appearing, symmetric, trachea midline.     Cardiovascular: Examination of peripheral vascular system shows no clubbing or cyanosis.    Respiratory: No respiratory distress increased work of breathing. Inspection of the chest with symmetric chest expansion and normal respiratory effort.    Skin: No head and neck rashes.    Lymph nodes: No adenopathy.     Assessment/Plan   Diagnoses and all orders for this visit:  Clogged ears  Reassurance given ears are clean today on exam. Follow up in 4 months. All questions answered to patient satisfaction.     All questions answered to patient satisfaction.      NU Baires 10/21/24 3:16 PM

## 2024-11-27 ENCOUNTER — OFFICE VISIT (OUTPATIENT)
Dept: OTOLARYNGOLOGY | Facility: CLINIC | Age: 54
End: 2024-11-27
Payer: MEDICARE

## 2024-11-27 VITALS
HEART RATE: 74 BPM | HEIGHT: 69 IN | DIASTOLIC BLOOD PRESSURE: 73 MMHG | TEMPERATURE: 97.5 F | BODY MASS INDEX: 29.62 KG/M2 | SYSTOLIC BLOOD PRESSURE: 125 MMHG | WEIGHT: 200 LBS

## 2024-11-27 DIAGNOSIS — H60.502 ACUTE OTITIS EXTERNA OF LEFT EAR, UNSPECIFIED TYPE: Primary | ICD-10-CM

## 2024-11-27 DIAGNOSIS — H92.02 LEFT EAR PAIN: ICD-10-CM

## 2024-11-27 PROCEDURE — 1036F TOBACCO NON-USER: CPT | Performed by: NURSE PRACTITIONER

## 2024-11-27 PROCEDURE — 3078F DIAST BP <80 MM HG: CPT | Performed by: NURSE PRACTITIONER

## 2024-11-27 PROCEDURE — 3074F SYST BP LT 130 MM HG: CPT | Performed by: NURSE PRACTITIONER

## 2024-11-27 PROCEDURE — 99213 OFFICE O/P EST LOW 20 MIN: CPT | Performed by: NURSE PRACTITIONER

## 2024-11-27 PROCEDURE — 3008F BODY MASS INDEX DOCD: CPT | Performed by: NURSE PRACTITIONER

## 2024-11-27 RX ORDER — CIPROFLOXACIN AND DEXAMETHASONE 3; 1 MG/ML; MG/ML
SUSPENSION/ DROPS AURICULAR (OTIC)
Qty: 7.5 ML | Refills: 0 | Status: SHIPPED | OUTPATIENT
Start: 2024-11-27

## 2024-11-27 RX ORDER — OFLOXACIN 3 MG/ML
SOLUTION AURICULAR (OTIC)
COMMUNITY
Start: 2024-11-26

## 2024-11-27 SDOH — ECONOMIC STABILITY: FOOD INSECURITY: WITHIN THE PAST 12 MONTHS, YOU WORRIED THAT YOUR FOOD WOULD RUN OUT BEFORE YOU GOT MONEY TO BUY MORE.: NEVER TRUE

## 2024-11-27 SDOH — ECONOMIC STABILITY: FOOD INSECURITY: WITHIN THE PAST 12 MONTHS, THE FOOD YOU BOUGHT JUST DIDN'T LAST AND YOU DIDN'T HAVE MONEY TO GET MORE.: NEVER TRUE

## 2024-11-27 ASSESSMENT — PAIN SCALES - GENERAL: PAINLEVEL_OUTOF10: 7

## 2024-11-27 ASSESSMENT — ENCOUNTER SYMPTOMS
OCCASIONAL FEELINGS OF UNSTEADINESS: 0
DEPRESSION: 0
LOSS OF SENSATION IN FEET: 0

## 2024-11-27 ASSESSMENT — LIFESTYLE VARIABLES
AUDIT-C TOTAL SCORE: 2
HOW MANY STANDARD DRINKS CONTAINING ALCOHOL DO YOU HAVE ON A TYPICAL DAY: 1 OR 2
HOW OFTEN DO YOU HAVE A DRINK CONTAINING ALCOHOL: 2-4 TIMES A MONTH
HOW OFTEN DO YOU HAVE SIX OR MORE DRINKS ON ONE OCCASION: NEVER
SKIP TO QUESTIONS 9-10: 1

## 2024-11-27 NOTE — PROGRESS NOTES
Subjective   Patient ID: Willard Arriaga is a 54 y.o. male who presents for Follow-up (ears).    HPI  Patient here for left ear. A couple of days ago the ear started hurting. It is feeling blocked. No drainage. Went to Urgent Care who started him on Ofloxacin.     Patient Active Problem List   Diagnosis    Whole body pain    Otalgia    Neurogenic pain    Cervicalgia    Tinnitus, left ear    Synovial cyst of lumbar facet joint    Spinal stenosis    Lumbosacral radiculopathy at S1    Lumbar stenosis with neurogenic claudication    Lumbar post-laminectomy syndrome    Labral tear of shoulder, left, sequela    Cervical radiculopathy    Solitary pulmonary nodule    Nodule of left lung    Sleep apnea    Obstructive sleep apnea (adult) (pediatric)    Submandibular sialolithiasis    Sensorineural hearing loss, bilateral    S/P lumbar spine operation    Rash    Postnasal drip    Polyneuropathy    Panic attacks    Pain in joint of left shoulder    Displacement of lumbar intervertebral disc without myelopathy    Obesity    Numbness and tingling of left leg    Nerve root and plexus disorder    Muscle weakness (generalized)    Lower extremity weakness    Lumbosacral spondylosis without myelopathy    Lipoma of abdominal wall    Lingual tonsillitis    IPMN (intraductal papillary mucinous neoplasm)    Internal hemorrhoid    Hypertension    HLD (hyperlipidemia)    Herpes zoster with complication    Herniated nucleus pulposus, L5-S1    Hemorrhage following tonsillectomy    Glossopharyngeal neuralgia    Eagle's syndrome    Generalized abdominal pain    Acid reflux    Fracture of wrist    Flank pain    Feeling of foreign body in throat    Fatty liver    Elevated LFTs    Elevated fasting glucose    Dysphagia    Drug reaction    Dermatitis, unspecified    Depression    Degenerative disc disease, cervical    Costochondral pain    Closed fracture of carpal bone    Chronic low back pain    Change in bowel habits    Atherosclerosis of aorta  (CMS-[facility])    Arthralgia of right knee    Anxiety    Ankle pain    Tonsil stone    [place] CAC score 200-399    Acute laryngitis    Acute diarrhea    Abnormal laboratory test    Abnormal CXR    Abnormal blood chemistry    Lung disease    Back pain with radiation    Sciatic nerve pain    Atherosclerosis of both carotid arteries     Past Surgical History:   Procedure Laterality Date    ANKLE SURGERY  07/31/2014    Ankle Surgery    CT ANGIO CORONARY ART WITH HEARTFLOW IF SCORE >30%  1/6/2023    CT ANGIO CORONARY ART WITH HEARTFLOW IF SCORE >30% 1/6/2023    OTHER SURGICAL HISTORY  07/31/2014    Fusion / Refusion Of 2-3 Vertebrae    OTHER SURGICAL HISTORY  07/31/2014    Ant Spinal Diskectomy, Osteophytectomy Lumbar Interspace    OTHER SURGICAL HISTORY  07/25/2019    Sialoadenectomy     Review of Systems    All other systems have been reviewed and are negative for complaints except for those mentioned in history of present illness, past medical history and problem list.    Objective   Physical Exam    Right Ear: External inspection of ear with no deformity, scars, or masses. EAC is clear.  TM is intact with no sign of infection, effusion, or retraction.  No perforation seen.     Left Ear: External inspection of ear with no deformity, scars, or masses. EAC is clear, but edematous. TM is intact with no sign of infection, effusion, or retraction.  No perforation seen.       Assessment/Plan   Diagnoses and all orders for this visit:  Otalgia, left  Otitis externa, left    Recommend starting Ciprodex for the next 7 days. Follow up next week. Follow dry ear precautions.     All questions answered to patient satisfaction.      Niki Dahl, VIRAL-CNP 11/27/24 1:29 PM

## 2025-01-20 ENCOUNTER — APPOINTMENT (OUTPATIENT)
Dept: OTOLARYNGOLOGY | Facility: CLINIC | Age: 55
End: 2025-01-20
Payer: COMMERCIAL

## 2025-02-19 ENCOUNTER — APPOINTMENT (OUTPATIENT)
Dept: OTOLARYNGOLOGY | Facility: CLINIC | Age: 55
End: 2025-02-19
Payer: COMMERCIAL